# Patient Record
Sex: FEMALE | Race: BLACK OR AFRICAN AMERICAN | URBAN - METROPOLITAN AREA
[De-identification: names, ages, dates, MRNs, and addresses within clinical notes are randomized per-mention and may not be internally consistent; named-entity substitution may affect disease eponyms.]

---

## 2020-02-13 ENCOUNTER — OFFICE VISIT (OUTPATIENT)
Dept: URBAN - METROPOLITAN AREA CLINIC 33 | Facility: CLINIC | Age: 55
End: 2020-02-13
Payer: COMMERCIAL

## 2020-02-13 DIAGNOSIS — M35.01 SICCA SYNDROME WITH KERATOCONJUNCTIVITIS: Primary | ICD-10-CM

## 2020-02-13 PROCEDURE — 92002 INTRM OPH EXAM NEW PATIENT: CPT | Performed by: OPTOMETRIST

## 2020-02-13 RX ORDER — TOBRAMYCIN 3 MG/G
0.3 % OINTMENT OPHTHALMIC
Qty: 1 | Refills: 0 | Status: INACTIVE
Start: 2020-02-13 | End: 2020-02-13

## 2020-02-13 ASSESSMENT — INTRAOCULAR PRESSURE: OD: 16

## 2020-02-13 ASSESSMENT — KERATOMETRY
OS: 41.25
OD: 41.63

## 2020-02-13 NOTE — IMPRESSION/PLAN
Impression: Sicca syndrome with keratoconjunctivitis: M35.01. Plan: No treatment is required at this time. Will continue to observe condition and or symptoms. Pt is OK to D/C tobramycin and use AT's for Comfort.

## 2020-02-20 ENCOUNTER — OFFICE VISIT (OUTPATIENT)
Dept: URBAN - METROPOLITAN AREA CLINIC 33 | Facility: CLINIC | Age: 55
End: 2020-02-20
Payer: COMMERCIAL

## 2020-02-20 DIAGNOSIS — H25.13 AGE-RELATED NUCLEAR CATARACT, BILATERAL: ICD-10-CM

## 2020-02-20 DIAGNOSIS — E11.3393 TYPE 2 DIAB W MODERATE NONPRLF DIAB RTNOP W/O MACULAR EDEMA, BILATERAL: ICD-10-CM

## 2020-02-20 PROCEDURE — 99213 OFFICE O/P EST LOW 20 MIN: CPT | Performed by: OPTOMETRIST

## 2020-02-20 ASSESSMENT — INTRAOCULAR PRESSURE
OD: 17
OS: 18

## 2020-02-20 ASSESSMENT — VISUAL ACUITY
OD: 20/50
OS: 20/40

## 2020-02-20 NOTE — IMPRESSION/PLAN
Impression: Type 2 diab w moderate nonprlf diab rtnop w/o macular edema, bilateral: I13.2714. Plan: Diabetes type II: moderate nonproliferative diabetic retinopathy, no signs of neovascularization noted. No treatment necessary at this time. Patient was instructed to monitor vision for sudden changes and to call if visual changes occur. Discussed ocular and systemic benefits of maintaining blood sugar control. Letter sent to communicate findings to PCP.

## 2020-02-20 NOTE — IMPRESSION/PLAN
Impression: Sicca syndrome with keratoconjunctivitis: M35.01. Plan: There is no evidence of permanent changes to the cornea. Recommend patient continue using artificial tears as needed.

## 2020-02-20 NOTE — IMPRESSION/PLAN
Impression: Age-related nuclear cataract, bilateral: H25.13. Plan: Cataracts account for the patient's complaints. Discussed all risks, benefits, procedures and recovery. Patient understands changing glasses will not improve vision. Patient desires to have surgery, recommend phacoemulsification with intraocular lens. Recommend CE OD then OS, Standard IOL, aim plano Discussed need for glasses after surgery If desired, patient is candidate for Toric IOL, (not a candidate for MF due diabetic retinopathy)